# Patient Record
Sex: MALE | Race: ASIAN | NOT HISPANIC OR LATINO | URBAN - METROPOLITAN AREA
[De-identification: names, ages, dates, MRNs, and addresses within clinical notes are randomized per-mention and may not be internally consistent; named-entity substitution may affect disease eponyms.]

---

## 2019-08-16 ASSESSMENT — MIFFLIN-ST. JEOR: SCORE: 1482.75

## 2019-08-19 ENCOUNTER — SURGERY - HEALTHEAST (OUTPATIENT)
Dept: SURGERY | Facility: CLINIC | Age: 69
End: 2019-08-19

## 2019-08-19 ENCOUNTER — ANESTHESIA - HEALTHEAST (OUTPATIENT)
Dept: SURGERY | Facility: CLINIC | Age: 69
End: 2019-08-19

## 2019-08-19 ASSESSMENT — MIFFLIN-ST. JEOR: SCORE: 1479.43

## 2021-05-31 NOTE — ANESTHESIA PREPROCEDURE EVALUATION
Anesthesia Evaluation      Patient summary reviewed   No history of anesthetic complications     Airway   Mallampati: II  Neck ROM: full   Pulmonary - normal exam    breath sounds clear to auscultation  (-) sleep apnea, not a smoker                         Cardiovascular - normal exam  Exercise tolerance: > or = 4 METS  (+) hypertension, , hypercholesterolemia,     (-) angina  Rhythm: regular  Rate: normal,         Neuro/Psych    (+) dementia,     Comments: [place]    Endo/Other    (+) diabetes mellitus type 2,      Comments: gout    GI/Hepatic/Renal    (+) GERD well controlled,   chronic renal disease CRI,           Dental - normal exam                        Anesthesia Plan  Planned anesthetic: general endotracheal    ASA 3   Induction: intravenous   Anesthetic plan and risks discussed with: patient and  services used  Anesthesia plan special considerations: antiemetics,   Post-op plan: routine recovery

## 2021-05-31 NOTE — ANESTHESIA POSTPROCEDURE EVALUATION
Patient: Oeun NO LUIS ANTONIO [place]  CIRCUMCISION  Anesthesia type: general    Patient location: PACU  Last vitals:   Vitals Value Taken Time   /66 8/19/2019  3:15 PM   Temp 36.3  C (97.3  F) 8/19/2019  3:08 PM   Pulse 69 8/19/2019  3:15 PM   Resp 18 8/19/2019  3:15 PM   SpO2 96 % 8/19/2019  3:15 PM     Post vital signs: stable  Level of consciousness: awake and responds to simple questions  Post-anesthesia pain: pain controlled  Post-anesthesia nausea and vomiting: no  Pulmonary: unassisted, return to baseline  Cardiovascular: stable and blood pressure at baseline  Hydration: adequate  Anesthetic events: no    QCDR Measures:  ASA# 11 - Yanelis-op Cardiac Arrest: ASA11B - Patient did NOT experience unanticipated cardiac arrest  ASA# 12 - Yanelis-op Mortality Rate: ASA12B - Patient did NOT die  ASA# 13 - PACU Re-Intubation Rate: ASA13B - Patient did NOT require a new airway mgmt  ASA# 10 - Composite Anes Safety: ASA10A - No serious adverse event    Additional Notes:

## 2021-05-31 NOTE — ANESTHESIA CARE TRANSFER NOTE
Pt breathing spontaneously, follows commands, suctioned extubated. Spontaneous respirations with SFM to PACU. VSS.    Last vitals:   Vitals:    08/19/19 1508   BP:    Pulse: 69   Resp: 16   Temp: 36.3  C (97.3  F)   SpO2: 98%     Patient's level of consciousness is drowsy  Spontaneous respirations: yes  Maintains airway independently: yes  Dentition unchanged: yes  Oropharynx: oropharynx clear of all foreign objects    QCDR Measures:  ASA# 20 - Surgical Safety Checklist: WHO surgical safety checklist completed prior to induction    PQRS# 430 - Adult PONV Prevention: 4558F - Pt received => 2 anti-emetic agents (different classes) preop & intraop  ASA# 8 - Peds PONV Prevention: NA - Not pediatric patient, not GA or 2 or more risk factors NOT present  PQRS# 424 - Yanelis-op Temp Management: 4559F - At least one body temp DOCUMENTED => 35.5C or 95.9F within required timeframe  PQRS# 426 - PACU Transfer Protocol: - Transfer of care checklist used  ASA# 14 - Acute Post-op Pain: ASA14B - Patient did NOT experience pain >= 7 out of 10

## 2021-06-03 VITALS — BODY MASS INDEX: 27.48 KG/M2 | HEIGHT: 66 IN | WEIGHT: 171 LBS
